# Patient Record
Sex: MALE | Race: WHITE | NOT HISPANIC OR LATINO | Employment: UNEMPLOYED | URBAN - METROPOLITAN AREA
[De-identification: names, ages, dates, MRNs, and addresses within clinical notes are randomized per-mention and may not be internally consistent; named-entity substitution may affect disease eponyms.]

---

## 2019-02-17 ENCOUNTER — HOSPITAL ENCOUNTER (EMERGENCY)
Facility: OTHER | Age: 24
Discharge: HOME | End: 2019-02-17
Attending: EMERGENCY MEDICINE

## 2019-02-17 VITALS
SYSTOLIC BLOOD PRESSURE: 119 MMHG | WEIGHT: 187.19 LBS | OXYGEN SATURATION: 99 % | HEIGHT: 68 IN | TEMPERATURE: 98 F | RESPIRATION RATE: 18 BRPM | HEART RATE: 71 BPM | DIASTOLIC BLOOD PRESSURE: 60 MMHG | BODY MASS INDEX: 28.37 KG/M2

## 2019-02-17 DIAGNOSIS — N50.811 PAIN IN RIGHT TESTICLE: Primary | ICD-10-CM

## 2019-02-17 DIAGNOSIS — N50.819 TESTICLE PAIN: ICD-10-CM

## 2019-02-17 PROCEDURE — 99284 EMERGENCY DEPT VISIT MOD MDM: CPT | Mod: 25

## 2019-02-17 RX ORDER — IBUPROFEN 600 MG/1
600 TABLET ORAL EVERY 6 HOURS PRN
Qty: 20 TABLET | Refills: 0 | Status: SHIPPED | OUTPATIENT
Start: 2019-02-17

## 2019-02-17 NOTE — ED NOTES
Two patient identifiers have been checked and are correct.      Appearance: Pt awake, alert & oriented to person, place & time. Pt in no acute distress at present time. Pt is clean and well groomed with clothes appropriately fastened.   Skin: Skin warm, dry & intact. Color consistent with ethnicity. Mucous membranes moist. No breakdown or brusing noted.   Musculoskeletal: Patient moving all extremities well, no obvious swelling or deformities noted.   Respiratory: Respirations spontaneous, even, and non-labored. Visible chest rise noted. Airway is open and patent. No accessory muscle use noted.   Neurologic: Sensation is intact. Speech is clear and appropriate. Eyes open spontaneously, behavior appropriate to situation, follows commands, facial expression symmetrical, bilateral hand grasp equal and even, purposeful motor response noted.  Cardiac: All peripheral pulses present. No Bilateral lower extremity edema. Cap refill is <3 seconds.  Abdomen: Abdomen soft, non-tender to palpation.   : Pt reports no dysuria or hematuria. R testicle pain, described as a knot.

## 2019-02-17 NOTE — ED PROVIDER NOTES
"Encounter Date: 2/17/2019    SCRIBE #1 NOTE: I, Marlene Bone, am scribing for, and in the presence of, Dr. Danielson.       History     Chief Complaint   Patient presents with    Male  Problem     Pt c/o a painful "knot" on the right testicle. PT states "it feels like it goes from right to left." Pt reports increased swelling since he first noticed it last week. Denies redness or infectin.     Time seen by provider: 3:22 PM    This is a 23 y.o. male who presents to the ED with complaint of knot on right testicle that he noticed a few days ago. Patient reports when taking a shower last night, he noticed the knot got bigger and has increased in pain. Patient reports a sharp pain in groin that radiated up into abdomen when he stepped down of a ladder earlier today at work. Patient reports he feels fine when he is sitting still. Patient dhruv blood in urine.      The history is provided by the patient.     Review of patient's allergies indicates:  No Known Allergies  History reviewed. No pertinent past medical history.  History reviewed. No pertinent surgical history.  No family history on file.  Social History     Tobacco Use    Smoking status: Never Smoker   Substance Use Topics    Alcohol use: Yes     Frequency: Never    Drug use: No     Review of Systems   Constitutional: Negative for fever.   HENT: Negative for sore throat.    Respiratory: Negative for shortness of breath.    Cardiovascular: Negative for chest pain.   Gastrointestinal: Positive for abdominal pain. Negative for nausea.   Genitourinary: Positive for testicular pain. Negative for dysuria and hematuria.        Positive for groin pain.   Musculoskeletal: Negative for back pain.   Skin: Negative for rash.   Neurological: Negative for weakness.   Hematological: Does not bruise/bleed easily.       Physical Exam     Initial Vitals [02/17/19 1219]   BP Pulse Resp Temp SpO2   (!) 120/59 67 18 98.2 °F (36.8 °C) 99 %      MAP       --         Physical " Exam    Nursing note and vitals reviewed.  Constitutional: He appears well-developed and well-nourished. He is not diaphoretic. No distress.   HENT:   Head: Normocephalic and atraumatic.   Eyes: Conjunctivae and EOM are normal. No scleral icterus.   Neck: Normal range of motion. Neck supple.   Cardiovascular: Normal rate, regular rhythm and normal heart sounds. Exam reveals no gallop and no friction rub.    No murmur heard.  Pulmonary/Chest: Breath sounds normal. No respiratory distress. He has no wheezes. He has no rhonchi. He has no rales.   Abdominal: Soft. Bowel sounds are normal. He exhibits no distension. There is no tenderness. There is no rebound and no guarding.   Genitourinary: Penis normal.   Genitourinary Comments: Normal size and lie. No cremasteric. No erythema. No induration of scrotum. Tiny nodular palpated at the superior aspect of right testicle appears to be tender and non-mobile. No inguinal hernia. Left scrotum normal.    Musculoskeletal: Normal range of motion. He exhibits no edema or tenderness.   Lymphadenopathy:     He has no cervical adenopathy.   Neurological: He is alert and oriented to person, place, and time.   Skin: Skin is warm and dry. No rash noted. No erythema. No pallor.   Psychiatric: He has a normal mood and affect. His behavior is normal. Judgment and thought content normal.         ED Course   Procedures  Labs Reviewed - No data to display       Imaging Results          US Scrotum And Testicles (Final result)  Result time 02/17/19 14:41:39    Final result by Toñito Hughes MD (02/17/19 14:41:39)                 Impression:      Normal scrotal ultrasound.      Electronically signed by: Toñito Hughes MD  Date:    02/17/2019  Time:    14:41             Narrative:    EXAMINATION:  US SCROTUM AND TESTICLES    CLINICAL HISTORY:  Testicular pain, unspecified    TECHNIQUE:  Sonography of the scrotum and testes.    COMPARISON:  None.    FINDINGS:  Right Testicle:    *Size: 4 x 2.3 x  3.3 cm  *Appearance: Normal.  *Flow: Normal arterial and venous flow  *Epididymis: Normal.  *Hydrocele: None.  *Varicocele: None.  .    Left Testicle:    *Size: 3.8 x 2.7 x 3 cm  *Appearance: Normal.  *Flow: Normal arterial and venous flow  *Epididymis: Normal.  *Hydrocele: None.  *Varicocele: None.  .    Other findings: None.                                 Medical Decision Making:   Clinical Tests:   Radiological Study: Ordered and Reviewed  ED Management:  23-year-old male with right testicular pain for 1 day.  My examination there is a very small nodule localize region.  This is not seen on ultrasound.  Does not appear to be torsion, epididymitis or orchitis.  Unsure as to the exact etiology of this painful area but I feel can be safely discharged to follow up with Urology as an outpatient.  Analgesia given.  Patient agrees to plan of care.  No hernias palpated.    Patient discharged home in stable condition. Diagnosis and treatment plan explained to patient. I have answered all questions and the patient is satisfied with the plan of care. The patient demonstrates understanding of the care plan. This is the extent to the patients complaints today here in the emergency department.            Scribe Attestation:   Scribe #1: I performed the above scribed service and the documentation accurately describes the services I performed. I attest to the accuracy of the note.    Attending Attestation:           Physician Attestation for Scribe:  Physician Attestation Statement for Scribe #1: I, Dr. Danielson, reviewed documentation, as scribed by Marlene Bone in my presence, and it is both accurate and complete.                    Clinical Impression:     1. Pain in right testicle    2. Testicle pain                                   Gino Danielson, DO  02/17/19 1539

## 2019-02-17 NOTE — ED NOTES
"Pt presents to ED via self with c/o of testicle pain x1 week. Pt reporting painful "knot" to R testicle, reports radiation of pain to L testicles. Pt denies discharge, dysuria. AAOx4, RR even and unlabored. Will continue to monitor.   "

## 2019-02-20 ENCOUNTER — HOSPITAL ENCOUNTER (EMERGENCY)
Facility: OTHER | Age: 24
Discharge: HOME | End: 2019-02-20
Attending: EMERGENCY MEDICINE

## 2019-02-20 VITALS
TEMPERATURE: 98 F | BODY MASS INDEX: 28.04 KG/M2 | OXYGEN SATURATION: 99 % | WEIGHT: 185 LBS | DIASTOLIC BLOOD PRESSURE: 81 MMHG | HEART RATE: 87 BPM | RESPIRATION RATE: 18 BRPM | HEIGHT: 68 IN | SYSTOLIC BLOOD PRESSURE: 120 MMHG

## 2019-02-20 DIAGNOSIS — Z02.89 ENCOUNTER TO OBTAIN EXCUSE FROM WORK: Primary | ICD-10-CM

## 2019-02-20 LAB
BILIRUB UR QL STRIP: NEGATIVE
C TRACH DNA SPEC QL NAA+PROBE: NOT DETECTED
CLARITY UR: CLEAR
COLOR UR: YELLOW
GLUCOSE UR QL STRIP: NEGATIVE
HGB UR QL STRIP: NEGATIVE
KETONES UR QL STRIP: NEGATIVE
LEUKOCYTE ESTERASE UR QL STRIP: NEGATIVE
N GONORRHOEA DNA SPEC QL NAA+PROBE: NOT DETECTED
NITRITE UR QL STRIP: NEGATIVE
PH UR STRIP: 6 [PH] (ref 5–8)
PROT UR QL STRIP: NEGATIVE
SP GR UR STRIP: 1.02 (ref 1–1.03)
URN SPEC COLLECT METH UR: NORMAL
UROBILINOGEN UR STRIP-ACNC: NEGATIVE EU/DL

## 2019-02-20 PROCEDURE — 81003 URINALYSIS AUTO W/O SCOPE: CPT

## 2019-02-20 PROCEDURE — 87491 CHLMYD TRACH DNA AMP PROBE: CPT

## 2019-02-20 PROCEDURE — 99283 EMERGENCY DEPT VISIT LOW MDM: CPT

## 2019-02-20 NOTE — ED TRIAGE NOTES
Pt reports being seen for a knot on testicle on 2/17. Requesting a return to work note. Pt reports swelling to testicle has gone down and only has light aching to testicle. Denies any change in urination.

## 2019-02-20 NOTE — ED PROVIDER NOTES
"Encounter Date: 2/20/2019    SCRIBE #1 NOTE: I, Melecio Huang, am scribing for, and in the presence of, Dr. Mueller.       History     Chief Complaint   Patient presents with    Letter for School/Work     needs return to work note. seen on 2/17 for knot on testicle. reports continued aching but reports improvement to swelling to testicle     Seen by provider: 8:54 AM    Patient is a 23 y.o. male who presents to the ED requesting clearance for work. Patient reports he left his work three days ago for right testicular pain and he here for evaluation at this time. He reports being told he could return to work after being evaluated by a urologist. He believed he had made an appointment with urology for today, but when he called this morning he was told that he did not have an appointment, which prompted him to come to the ED for a note saying that he is cleared to return to work. He had a normal scrotal ultrasound when evaluated here three days ago and he states his pain has mostly improved. He denies any urinary symptoms. He states "I'm ready to go back to work." He has no additional complaints at this time.       The history is provided by the patient.     Review of patient's allergies indicates:  No Known Allergies  No past medical history on file.  No past surgical history on file.  No family history on file.  Social History     Tobacco Use    Smoking status: Never Smoker   Substance Use Topics    Alcohol use: Yes     Frequency: Never    Drug use: No     Review of Systems   Constitutional: Negative for fever.   HENT: Negative for rhinorrhea.    Eyes: Negative for redness.   Respiratory: Negative for shortness of breath.    Cardiovascular: Negative for chest pain.   Gastrointestinal: Negative for abdominal pain.   Genitourinary: Positive for testicular pain. Negative for discharge, dysuria, frequency and hematuria.   Musculoskeletal: Negative for gait problem.   Skin: Negative for rash. "   Psychiatric/Behavioral: Negative for confusion.       Physical Exam     Initial Vitals [02/20/19 0826]   BP Pulse Resp Temp SpO2   120/81 87 18 98.1 °F (36.7 °C) 99 %      MAP       --         Physical Exam    Nursing note and vitals reviewed.  Constitutional: He appears well-developed and well-nourished. He is not diaphoretic. No distress.   HENT:   Head: Normocephalic and atraumatic.   Mouth/Throat: Oropharynx is clear and moist.   Eyes: Conjunctivae and EOM are normal. Pupils are equal, round, and reactive to light.   Neck: Normal range of motion. Neck supple.   Cardiovascular: Normal rate, regular rhythm, S1 normal, S2 normal and normal heart sounds. Exam reveals no gallop and no friction rub.    No murmur heard.  Pulmonary/Chest: Breath sounds normal. No respiratory distress. He has no wheezes. He has no rhonchi. He has no rales.   Musculoskeletal: Normal range of motion. He exhibits no edema or tenderness.   No lower extremity edema.    Lymphadenopathy:     He has no cervical adenopathy.   Neurological: He is alert and oriented to person, place, and time.   Skin: Skin is warm and dry. Capillary refill takes less than 2 seconds. No rash noted. No pallor.   Psychiatric: He has a normal mood and affect. His behavior is normal. Judgment and thought content normal.         ED Course   Procedures  Labs Reviewed   C. TRACHOMATIS/N. GONORRHOEAE BY AMP DNA   URINALYSIS          Imaging Results    None          Medical Decision Making:   Clinical Tests:   Lab Tests: Ordered and Reviewed            Scribe Attestation:   Scribe #1: I performed the above scribed service and the documentation accurately describes the services I performed. I attest to the accuracy of the note.    Attending Attestation:           Physician Attestation for Scribe:  Physician Attestation Statement for Scribe #1: I, Dr. Mueller, reviewed documentation, as scribed by Melecio Huang in my presence, and it is both accurate and complete.          Attending ED Notes:   Emergent evaluation of a 23-year-old male requesting note to go back to work after being in the emergency room a few days ago for pain to the testicle.  Ultrasound at that time revealed no acute findings.  Patient states his symptoms have nearly resolved.  No acute findings in urinary analysis.  Chlamydia and gonorrhea not detected.  The patient extensively counseled on his diagnosis and treatment including all laboratory and physical exam findings.  The patient discharged good condition and directed follow-up with his PCP in the next 24-48 hours.             Clinical Impression:     1. Encounter to obtain excuse from work                                  Jose Ramsey MD  02/22/19 6799

## 2024-09-25 ENCOUNTER — HOSPITAL ENCOUNTER (EMERGENCY)
Facility: HOSPITAL | Age: 29
Discharge: PSYCHIATRIC OR SUBSTANCE USE FACILITY | End: 2024-09-25
Attending: STUDENT IN AN ORGANIZED HEALTH CARE EDUCATION/TRAINING PROGRAM
Payer: MEDICAID

## 2024-09-25 VITALS
TEMPERATURE: 98 F | WEIGHT: 160 LBS | HEIGHT: 70 IN | BODY MASS INDEX: 22.9 KG/M2 | DIASTOLIC BLOOD PRESSURE: 72 MMHG | SYSTOLIC BLOOD PRESSURE: 119 MMHG | HEART RATE: 67 BPM | RESPIRATION RATE: 18 BRPM | OXYGEN SATURATION: 98 %

## 2024-09-25 DIAGNOSIS — R45.89 SUICIDAL BEHAVIOR WITHOUT ATTEMPTED SELF-INJURY: Primary | ICD-10-CM

## 2024-09-25 LAB
ALBUMIN SERPL BCP-MCNC: 3.9 G/DL (ref 3.5–5.2)
ALP SERPL-CCNC: 83 U/L (ref 55–135)
ALT SERPL W/O P-5'-P-CCNC: 17 U/L (ref 10–44)
AMPHET+METHAMPHET UR QL: ABNORMAL
ANION GAP SERPL CALC-SCNC: 10 MMOL/L (ref 8–16)
APAP SERPL-MCNC: <3 UG/ML (ref 10–20)
AST SERPL-CCNC: 31 U/L (ref 10–40)
BARBITURATES UR QL SCN>200 NG/ML: NEGATIVE
BASOPHILS # BLD AUTO: 0.03 K/UL (ref 0–0.2)
BASOPHILS NFR BLD: 0.3 % (ref 0–1.9)
BENZODIAZ UR QL SCN>200 NG/ML: NEGATIVE
BILIRUB SERPL-MCNC: 0.4 MG/DL (ref 0.1–1)
BILIRUB UR QL STRIP: NEGATIVE
BUN SERPL-MCNC: 10 MG/DL (ref 6–20)
BZE UR QL SCN: ABNORMAL
CALCIUM SERPL-MCNC: 9.1 MG/DL (ref 8.7–10.5)
CANNABINOIDS UR QL SCN: ABNORMAL
CHLORIDE SERPL-SCNC: 102 MMOL/L (ref 95–110)
CLARITY UR REFRACT.AUTO: CLEAR
CO2 SERPL-SCNC: 24 MMOL/L (ref 23–29)
COLOR UR AUTO: YELLOW
CREAT SERPL-MCNC: 0.9 MG/DL (ref 0.5–1.4)
CREAT UR-MCNC: 164 MG/DL (ref 23–375)
DIFFERENTIAL METHOD BLD: ABNORMAL
EOSINOPHIL # BLD AUTO: 0 K/UL (ref 0–0.5)
EOSINOPHIL NFR BLD: 0.4 % (ref 0–8)
ERYTHROCYTE [DISTWIDTH] IN BLOOD BY AUTOMATED COUNT: 12.4 % (ref 11.5–14.5)
EST. GFR  (NO RACE VARIABLE): >60 ML/MIN/1.73 M^2
ETHANOL SERPL-MCNC: <10 MG/DL
GLUCOSE SERPL-MCNC: 91 MG/DL (ref 70–110)
GLUCOSE UR QL STRIP: NEGATIVE
HCT VFR BLD AUTO: 42.5 % (ref 40–54)
HCV AB SERPL QL IA: NORMAL
HGB BLD-MCNC: 14.6 G/DL (ref 14–18)
HGB UR QL STRIP: NEGATIVE
HIV 1+2 AB+HIV1 P24 AG SERPL QL IA: NORMAL
IMM GRANULOCYTES # BLD AUTO: 0.03 K/UL (ref 0–0.04)
IMM GRANULOCYTES NFR BLD AUTO: 0.3 % (ref 0–0.5)
KETONES UR QL STRIP: ABNORMAL
LEUKOCYTE ESTERASE UR QL STRIP: NEGATIVE
LYMPHOCYTES # BLD AUTO: 1.2 K/UL (ref 1–4.8)
LYMPHOCYTES NFR BLD: 10.9 % (ref 18–48)
MCH RBC QN AUTO: 30.9 PG (ref 27–31)
MCHC RBC AUTO-ENTMCNC: 34.4 G/DL (ref 32–36)
MCV RBC AUTO: 90 FL (ref 82–98)
METHADONE UR QL SCN>300 NG/ML: NEGATIVE
MONOCYTES # BLD AUTO: 0.6 K/UL (ref 0.3–1)
MONOCYTES NFR BLD: 5.3 % (ref 4–15)
NEUTROPHILS # BLD AUTO: 9 K/UL (ref 1.8–7.7)
NEUTROPHILS NFR BLD: 82.8 % (ref 38–73)
NITRITE UR QL STRIP: NEGATIVE
NRBC BLD-RTO: 0 /100 WBC
OPIATES UR QL SCN: NEGATIVE
PCP UR QL SCN>25 NG/ML: NEGATIVE
PH UR STRIP: 7 [PH] (ref 5–8)
PLATELET # BLD AUTO: 216 K/UL (ref 150–450)
PMV BLD AUTO: 9.1 FL (ref 9.2–12.9)
POTASSIUM SERPL-SCNC: 3.9 MMOL/L (ref 3.5–5.1)
PROT SERPL-MCNC: 7.2 G/DL (ref 6–8.4)
PROT UR QL STRIP: NEGATIVE
RBC # BLD AUTO: 4.73 M/UL (ref 4.6–6.2)
SALICYLATES SERPL-MCNC: <5 MG/DL (ref 15–30)
SODIUM SERPL-SCNC: 136 MMOL/L (ref 136–145)
SP GR UR STRIP: 1.01 (ref 1–1.03)
TOXICOLOGY INFORMATION: ABNORMAL
TSH SERPL DL<=0.005 MIU/L-ACNC: 0.78 UIU/ML (ref 0.4–4)
URN SPEC COLLECT METH UR: ABNORMAL
WBC # BLD AUTO: 10.87 K/UL (ref 3.9–12.7)

## 2024-09-25 PROCEDURE — 80307 DRUG TEST PRSMV CHEM ANLYZR: CPT | Performed by: STUDENT IN AN ORGANIZED HEALTH CARE EDUCATION/TRAINING PROGRAM

## 2024-09-25 PROCEDURE — 80179 DRUG ASSAY SALICYLATE: CPT | Performed by: STUDENT IN AN ORGANIZED HEALTH CARE EDUCATION/TRAINING PROGRAM

## 2024-09-25 PROCEDURE — 87389 HIV-1 AG W/HIV-1&-2 AB AG IA: CPT | Performed by: STUDENT IN AN ORGANIZED HEALTH CARE EDUCATION/TRAINING PROGRAM

## 2024-09-25 PROCEDURE — 86803 HEPATITIS C AB TEST: CPT | Performed by: STUDENT IN AN ORGANIZED HEALTH CARE EDUCATION/TRAINING PROGRAM

## 2024-09-25 PROCEDURE — 99285 EMERGENCY DEPT VISIT HI MDM: CPT

## 2024-09-25 PROCEDURE — 81003 URINALYSIS AUTO W/O SCOPE: CPT | Mod: 59 | Performed by: STUDENT IN AN ORGANIZED HEALTH CARE EDUCATION/TRAINING PROGRAM

## 2024-09-25 PROCEDURE — 80143 DRUG ASSAY ACETAMINOPHEN: CPT | Performed by: STUDENT IN AN ORGANIZED HEALTH CARE EDUCATION/TRAINING PROGRAM

## 2024-09-25 PROCEDURE — 82077 ASSAY SPEC XCP UR&BREATH IA: CPT | Performed by: STUDENT IN AN ORGANIZED HEALTH CARE EDUCATION/TRAINING PROGRAM

## 2024-09-25 PROCEDURE — 85025 COMPLETE CBC W/AUTO DIFF WBC: CPT | Performed by: STUDENT IN AN ORGANIZED HEALTH CARE EDUCATION/TRAINING PROGRAM

## 2024-09-25 PROCEDURE — 84443 ASSAY THYROID STIM HORMONE: CPT | Performed by: STUDENT IN AN ORGANIZED HEALTH CARE EDUCATION/TRAINING PROGRAM

## 2024-09-25 PROCEDURE — 80053 COMPREHEN METABOLIC PANEL: CPT | Performed by: STUDENT IN AN ORGANIZED HEALTH CARE EDUCATION/TRAINING PROGRAM

## 2024-09-25 NOTE — ED NOTES
Contacted lab, spoke w/ Vinny who states that she did not receive the UDS specimen. Both urine tubes sent to lab @ same time via tube system. U/A resulted. Calling back to lab

## 2024-09-25 NOTE — ED NOTES
Escorted to & from the restroom where patient voided. Patient resumed a resting position in bed w/ eyes closed. NAD, DVC maintained for safety.

## 2024-09-25 NOTE — ED NOTES
First attempt to call report to  [facility], can't accept report because their doctor did not place orders yet

## 2024-09-25 NOTE — ED NOTES
LOC: The patient is asleep  APPEARANCE: Patient resting comfortably and in no acute distress.  SKIN: The skin is warm and dry  MUSKULOSKELETAL: no obvious deformities noted.  RESPIRATORY: Airway is open and patent, respirations are spontaneous, patient has a normal effort and rate, no accessory muscle use noted.

## 2024-09-25 NOTE — PROVIDER PROGRESS NOTES - EMERGENCY DEPT.
Encounter Date: 9/25/2024    ED Physician Progress Notes        6:59 AM  Will need UDS before acceptance  Per report    7:29 AM  UDS resulted and is positive for cocaine, amphetamines, marijuana    Final diagnoses:  [R45.89] Suicidal behavior without attempted self-injury (Primary)

## 2024-09-25 NOTE — ED PROVIDER NOTES
"Encounter Date: 9/25/2024       History     Chief Complaint   Patient presents with    Suicidal     Got into argument with mom and pulled 22 rifle and put to his neck as an ultimatum with the mom.. Ervin PAYNE called.Hx of Bipolar     29-year-old male presents for suicidal threat.  Patient was in an argument with his mom, and they were yelling at each other, and he could not figure out a way to get her to stop "yelling at him like a dog", so he grabbed a 22 rifle and said "this maybe the last time you get a chance to hear from me", at which point they parted ways, he put the gun down, and she called 911.  The 's arrived and brought him in for further evaluation.  He denies suicidal intent stating "I have no interest in leaving this rock any time soon", and he relinquished his gun to the police.  He does not drink, he does smoke marijuana, he has no history of prior psychiatric disease or hospitalization.      Review of patient's allergies indicates:  No Known Allergies  No past medical history on file.  No past surgical history on file.  No family history on file.     Review of Systems   Psychiatric/Behavioral:  Positive for suicidal ideas.        Physical Exam     Initial Vitals [09/25/24 0039]   BP Pulse Resp Temp SpO2   (!) 144/84 72 18 99.4 °F (37.4 °C) 99 %      MAP       --         Physical Exam    Nursing note and vitals reviewed.  Constitutional: He appears well-developed and well-nourished.   HENT:   Head: Normocephalic and atraumatic.   Mouth/Throat: Oropharynx is clear and moist.   Eyes: Conjunctivae and EOM are normal. Pupils are equal, round, and reactive to light.   Neck: No thyromegaly present.   Normal range of motion.  Cardiovascular:  Normal rate, regular rhythm and intact distal pulses.           Pulmonary/Chest: Breath sounds normal. No respiratory distress. He has no wheezes.   Abdominal: Abdomen is soft. Bowel sounds are normal. He exhibits no distension. There is no abdominal tenderness. "   Musculoskeletal:         General: No tenderness or edema. Normal range of motion.      Cervical back: Normal range of motion.     Neurological: He is alert and oriented to person, place, and time. He has normal strength. No cranial nerve deficit.   Skin: Skin is warm and dry. No rash noted.   Psychiatric: He has a normal mood and affect. His behavior is normal. Thought content normal.         ED Course   Procedures  Labs Reviewed   CBC W/ AUTO DIFFERENTIAL - Abnormal       Result Value    WBC 10.87      RBC 4.73      Hemoglobin 14.6      Hematocrit 42.5      MCV 90      MCH 30.9      MCHC 34.4      RDW 12.4      Platelets 216      MPV 9.1 (*)     Immature Granulocytes 0.3      Gran # (ANC) 9.0 (*)     Immature Grans (Abs) 0.03      Lymph # 1.2      Mono # 0.6      Eos # 0.0      Baso # 0.03      nRBC 0      Gran % 82.8 (*)     Lymph % 10.9 (*)     Mono % 5.3      Eosinophil % 0.4      Basophil % 0.3      Differential Method Automated      Narrative:     Release to patient->Immediate   URINALYSIS, REFLEX TO URINE CULTURE - Abnormal    Specimen UA Urine, Clean Catch      Color, UA Yellow      Appearance, UA Clear      pH, UA 7.0      Specific Gravity, UA 1.015      Protein, UA Negative      Glucose, UA Negative      Ketones, UA 1+ (*)     Bilirubin (UA) Negative      Occult Blood UA Negative      Nitrite, UA Negative      Leukocytes, UA Negative      Narrative:     Specimen Source->Urine   ACETAMINOPHEN LEVEL - Abnormal    Acetaminophen (Tylenol), Serum <3.0 (*)     Narrative:     Release to patient->Immediate   SALICYLATE LEVEL - Abnormal    Salicylate Lvl <5.0 (*)     Narrative:     Release to patient->Immediate   COMPREHENSIVE METABOLIC PANEL    Sodium 136      Potassium 3.9      Chloride 102      CO2 24      Glucose 91      BUN 10      Creatinine 0.9      Calcium 9.1      Total Protein 7.2      Albumin 3.9      Total Bilirubin 0.4      Alkaline Phosphatase 83      AST 31      ALT 17      eGFR >60.0      Anion  Gap 10      Narrative:     Release to patient->Immediate   ALCOHOL,MEDICAL (ETHANOL)    Alcohol, Serum <10      Narrative:     Release to patient->Immediate   HIV 1 / 2 ANTIBODY   HEPATITIS C ANTIBODY   TSH   DRUG SCREEN PANEL, URINE EMERGENCY          Imaging Results    None          Medications - No data to display  Medical Decision Making  29-year-old male brought in under [facility] for suicidal threat with 22 rifle.  Patient now denies suicidal ideation or homicidal ideation.  States he made a threat under duress and did not mean it.  He relinquished his rifle to the police.  He does not drink alcohol.  He has no history of depression.  However, given the risk of suicide by fire arm for young men and the threatened seemed credible based on mom's report, pec placed and medical clearance labs sent.  Labs within normal limits, no underlying medical condition would explain his suicidal threat.  Case discussed with Psychiatry, they evaluated the patient, and recommended continuing pec for suicidal ideation.  Pec placed.  Patient is medically cleared for psychiatric placement.    Amount and/or Complexity of Data Reviewed  Labs: ordered.                  Medically cleared for psychiatry placement: 9/25/2024  1:28 AM                   Clinical Impression:  Final diagnoses:  [R45.89] Suicidal behavior without attempted self-injury (Primary)          ED Disposition Condition    Transfer to Psych Facility Stable          ED Prescriptions    None       Follow-up Information    None          Franky Hancock MD  09/25/24 023
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
## 2024-09-25 NOTE — ED NOTES
Patient escorted by EMS & hospital security to room #27. He is attired in [facility] provided scrubs w/ good grooming & hygiene.

## 2024-09-25 NOTE — ED NOTES
Francoise Dutton  Significant other  [phone]  Notify on admission    Called at this time: no answer

## 2024-09-25 NOTE — ED NOTES
Appears asleep w/ eyes closed, rise/fall of chest noted. DVC maintained. Awaiting results of the UDS so that a placement search can be started.
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
## 2024-09-26 PROBLEM — F43.25 ADJUSTMENT DISORDER WITH MIXED DISTURBANCE OF EMOTIONS AND CONDUCT: Status: ACTIVE | Noted: 2024-09-26

## 2024-09-26 PROBLEM — F43.25 ADJUSTMENT DISORDER WITH MIXED DISTURBANCE OF EMOTIONS AND CONDUCT: Status: RESOLVED | Noted: 2024-09-26 | Resolved: 2024-09-26

## 2024-09-26 PROBLEM — F43.29 ADJUSTMENT DISORDER WITH DISTURBANCE OF EMOTION: Status: ACTIVE | Noted: 2024-09-26
